# Patient Record
Sex: MALE | Race: WHITE
[De-identification: names, ages, dates, MRNs, and addresses within clinical notes are randomized per-mention and may not be internally consistent; named-entity substitution may affect disease eponyms.]

---

## 2018-08-21 ENCOUNTER — RECORD ABSTRACTING (OUTPATIENT)
Age: 73
End: 2018-08-21

## 2018-08-21 DIAGNOSIS — I25.10 ATHEROSCLEROTIC HEART DISEASE OF NATIVE CORONARY ARTERY W/OUT ANGINA PECTORIS: ICD-10-CM

## 2018-08-21 DIAGNOSIS — Z87.39 PERSONAL HISTORY OF OTHER DISEASES OF THE MUSCULOSKELETAL SYSTEM AND CONNECTIVE TISSUE: ICD-10-CM

## 2018-08-21 DIAGNOSIS — N28.1 CYST OF KIDNEY, ACQUIRED: ICD-10-CM

## 2018-08-21 DIAGNOSIS — Z86.59 PERSONAL HISTORY OF OTHER MENTAL AND BEHAVIORAL DISORDERS: ICD-10-CM

## 2018-08-21 DIAGNOSIS — Z86.79 PERSONAL HISTORY OF OTHER DISEASES OF THE CIRCULATORY SYSTEM: ICD-10-CM

## 2018-08-21 LAB
HBA1C MFR BLD: 8.3
PODIATRY EVAL: NORMAL

## 2018-08-21 RX ORDER — LANCETS 28 GAUGE
EACH MISCELLANEOUS
Refills: 0 | Status: ACTIVE | COMMUNITY

## 2018-08-21 RX ORDER — DULOXETINE HYDROCHLORIDE 30 MG/1
30 CAPSULE, DELAYED RELEASE PELLETS ORAL DAILY
Refills: 0 | Status: ACTIVE | COMMUNITY

## 2018-08-21 RX ORDER — ALLOPURINOL 100 MG/1
100 TABLET ORAL DAILY
Refills: 0 | Status: ACTIVE | COMMUNITY

## 2018-08-21 RX ORDER — BLOOD SUGAR DIAGNOSTIC
STRIP MISCELLANEOUS 4 TIMES DAILY
Refills: 0 | Status: ACTIVE | COMMUNITY

## 2018-08-21 RX ORDER — CARVEDILOL 6.25 MG/1
6.25 TABLET, FILM COATED ORAL TWICE DAILY
Refills: 0 | Status: ACTIVE | COMMUNITY

## 2018-08-21 RX ORDER — ATORVASTATIN CALCIUM 10 MG/1
10 TABLET, FILM COATED ORAL DAILY
Qty: 90 | Refills: 3 | Status: ACTIVE | COMMUNITY

## 2018-08-21 RX ORDER — CARBIDOPA AND LEVODOPA 25; 100 MG/1; MG/1
25-100 TABLET ORAL
Refills: 0 | Status: ACTIVE | COMMUNITY

## 2018-08-21 RX ORDER — LIRAGLUTIDE 6 MG/ML
18 INJECTION SUBCUTANEOUS DAILY
Refills: 0 | Status: ACTIVE | COMMUNITY

## 2018-08-29 ENCOUNTER — APPOINTMENT (OUTPATIENT)
Dept: ENDOCRINOLOGY | Facility: CLINIC | Age: 73
End: 2018-08-29
Payer: MEDICARE

## 2018-08-29 VITALS
SYSTOLIC BLOOD PRESSURE: 120 MMHG | WEIGHT: 312 LBS | BODY MASS INDEX: 43.68 KG/M2 | HEART RATE: 65 BPM | DIASTOLIC BLOOD PRESSURE: 64 MMHG | HEIGHT: 71 IN

## 2018-08-29 LAB — GLUCOSE BLDC GLUCOMTR-MCNC: 235

## 2018-08-29 PROCEDURE — 82962 GLUCOSE BLOOD TEST: CPT

## 2018-08-29 PROCEDURE — 99214 OFFICE O/P EST MOD 30 MIN: CPT | Mod: 25

## 2018-11-19 ENCOUNTER — RECORD ABSTRACTING (OUTPATIENT)
Age: 73
End: 2018-11-19

## 2018-11-20 ENCOUNTER — APPOINTMENT (OUTPATIENT)
Dept: ENDOCRINOLOGY | Facility: CLINIC | Age: 73
End: 2018-11-20
Payer: MEDICARE

## 2018-11-20 ENCOUNTER — RESULT CHARGE (OUTPATIENT)
Age: 73
End: 2018-11-20

## 2018-11-20 VITALS
HEART RATE: 76 BPM | WEIGHT: 315 LBS | SYSTOLIC BLOOD PRESSURE: 130 MMHG | BODY MASS INDEX: 44.1 KG/M2 | HEIGHT: 71 IN | DIASTOLIC BLOOD PRESSURE: 70 MMHG

## 2018-11-20 LAB — GLUCOSE BLDC GLUCOMTR-MCNC: 249

## 2018-11-20 PROCEDURE — 99214 OFFICE O/P EST MOD 30 MIN: CPT | Mod: 25

## 2018-11-20 PROCEDURE — 95250 CONT GLUC MNTR PHYS/QHP EQP: CPT

## 2018-11-20 RX ORDER — FLASH GLUCOSE SENSOR
KIT MISCELLANEOUS
Qty: 0 | Refills: 0 | Status: COMPLETED | OUTPATIENT
Start: 2018-11-20

## 2018-11-20 RX ADMIN — Medication 0: at 00:00

## 2018-11-29 ENCOUNTER — TRANSCRIPTION ENCOUNTER (OUTPATIENT)
Age: 73
End: 2018-11-29

## 2018-12-02 ENCOUNTER — RESULT CHARGE (OUTPATIENT)
Age: 73
End: 2018-12-02

## 2018-12-21 ENCOUNTER — CLINICAL ADVICE (OUTPATIENT)
Age: 73
End: 2018-12-21

## 2019-02-13 ENCOUNTER — OTHER (OUTPATIENT)
Age: 74
End: 2019-02-13

## 2019-02-20 ENCOUNTER — RECORD ABSTRACTING (OUTPATIENT)
Age: 74
End: 2019-02-20

## 2019-02-20 DIAGNOSIS — Z87.39 PERSONAL HISTORY OF OTHER DISEASES OF THE MUSCULOSKELETAL SYSTEM AND CONNECTIVE TISSUE: ICD-10-CM

## 2019-02-20 DIAGNOSIS — Z86.69 PERSONAL HISTORY OF OTHER DISEASES OF THE NERVOUS SYSTEM AND SENSE ORGANS: ICD-10-CM

## 2019-02-20 DIAGNOSIS — Z78.9 OTHER SPECIFIED HEALTH STATUS: ICD-10-CM

## 2019-03-04 LAB
HBA1C MFR BLD HPLC: 7.9
LDLC SERPL DIRECT ASSAY-MCNC: 45
MICROALBUMIN/CREAT 24H UR-RTO: 39.7

## 2019-03-05 ENCOUNTER — APPOINTMENT (OUTPATIENT)
Dept: ENDOCRINOLOGY | Facility: CLINIC | Age: 74
End: 2019-03-05
Payer: MEDICARE

## 2019-03-05 ENCOUNTER — RESULT CHARGE (OUTPATIENT)
Age: 74
End: 2019-03-05

## 2019-03-05 VITALS
WEIGHT: 285 LBS | DIASTOLIC BLOOD PRESSURE: 70 MMHG | HEIGHT: 71 IN | HEART RATE: 65 BPM | SYSTOLIC BLOOD PRESSURE: 136 MMHG | BODY MASS INDEX: 39.9 KG/M2

## 2019-03-05 LAB — GLUCOSE BLDC GLUCOMTR-MCNC: 193

## 2019-03-05 PROCEDURE — 99214 OFFICE O/P EST MOD 30 MIN: CPT | Mod: 25

## 2019-03-05 PROCEDURE — 82962 GLUCOSE BLOOD TEST: CPT

## 2019-03-05 RX ORDER — GABAPENTIN 300 MG/1
300 CAPSULE ORAL
Refills: 0 | Status: ACTIVE | COMMUNITY

## 2019-03-05 RX ORDER — TAMSULOSIN HYDROCHLORIDE 0.4 MG/1
0.4 CAPSULE ORAL
Refills: 0 | Status: ACTIVE | COMMUNITY

## 2019-03-05 RX ORDER — RIVAROXABAN 20 MG/1
20 TABLET, FILM COATED ORAL DAILY
Refills: 0 | Status: ACTIVE | COMMUNITY

## 2019-03-05 RX ORDER — INSULIN ASPART 100 [IU]/ML
(70-30) 100 INJECTION, SUSPENSION SUBCUTANEOUS AS DIRECTED
Refills: 0 | Status: DISCONTINUED | COMMUNITY
End: 2019-03-05

## 2019-03-05 NOTE — REVIEW OF SYSTEMS
[Chest Pain] : no chest pain [Shortness Of Breath] : no shortness of breath [de-identified] : irritated skin around waist

## 2019-03-05 NOTE — ASSESSMENT
[FreeTextEntry1] : 1. DM type 2, more stable control on basal insulin. Doing as well as can be expected.\par 2. Hyperlipidemia stable\par 3. Hypertension controlled

## 2019-03-05 NOTE — HISTORY OF PRESENT ILLNESS
[FreeTextEntry1] : Quality:  Type 2.   \par Severity:  Moderate\par Duration:  27\par Onset:  found DM on labs\par Associated Symptoms:  Nephropathy. Retinopathy. Neuropathy \par Modifying Factors:  Better with medication.   \par Notes:  Current regimen:\par 	basaglar 36\par 	actos 	15\par 	victoza	1.8\par metformin caused diarrhea\par byetta ineffective\par \par \par Blood Glucose Testing Information \par \par Patient is using the  meter and is testing 3 times per day.\par \par \par Past Medical History\par Notes:  ASHD, found on cath. No CHF or MI.\par ? silent CVA\par RBBB\par off simvastatin due to arthralgia; no improvement with discontinuation. Tolerating atorvastatin\par renal cysts\par sleep apnea, on CPAP\par started on amitriptyline\par NAFLD score >3\par Parkinson’s disease\par hyperkalemia, given kayexelate\par hypomagnesemia\par prostate CA-on hormone therapy. s/p RT and cyber-knife\par pacemaker. On xarelto\par

## 2019-04-19 ENCOUNTER — TRANSCRIPTION ENCOUNTER (OUTPATIENT)
Age: 74
End: 2019-04-19

## 2019-07-30 ENCOUNTER — APPOINTMENT (OUTPATIENT)
Dept: ENDOCRINOLOGY | Facility: CLINIC | Age: 74
End: 2019-07-30
Payer: MEDICARE

## 2019-07-30 VITALS
BODY MASS INDEX: 40.74 KG/M2 | SYSTOLIC BLOOD PRESSURE: 120 MMHG | HEIGHT: 71 IN | WEIGHT: 291 LBS | DIASTOLIC BLOOD PRESSURE: 70 MMHG | HEART RATE: 67 BPM

## 2019-07-30 PROCEDURE — 82962 GLUCOSE BLOOD TEST: CPT

## 2019-07-30 PROCEDURE — 99214 OFFICE O/P EST MOD 30 MIN: CPT | Mod: 25

## 2019-07-30 NOTE — PHYSICAL EXAM
[Thyroid Not Enlarged] : the thyroid was not enlarged [Clear to Auscultation] : lungs were clear to auscultation bilaterally [de-identified] : irregular rhythm [de-identified] : bilateral edema

## 2019-07-30 NOTE — REVIEW OF SYSTEMS
[Recent Weight Gain (___ Lbs)] : recent [unfilled] ~Ulb weight gain [Lower Ext Edema] : lower extremity edema

## 2019-07-30 NOTE — HISTORY OF PRESENT ILLNESS
[FreeTextEntry1] : Quality:  Type 2.   \par Severity:  Moderate\par Duration:  27\par Onset:  found DM on labs\par Associated Symptoms:  Nephropathy. Retinopathy. Neuropathy \par Modifying Factors:  Better with medication.   \par Notes:  Current regimen:\par 	basaglar 36\par 	actos 	15\par 	victoza	1.8\par metformin caused diarrhea\par byetta ineffective\par \par \par Blood Glucose Testing Information \par \par Patient is using the  meter and is testing 3 times per day.\par \par \par Past Medical History\par Notes:  ASHD, found on cath. No CHF or MI.\par ? silent CVA\par RBBB\par off simvastatin due to arthralgia; no improvement with discontinuation. Tolerating atorvastatin\par renal cysts\par sleep apnea, on CPAP\par started on amitriptyline\par NAFLD score >3\par Parkinson’s disease\par hyperkalemia, given kayexelate\par hypomagnesemia\par prostate CA-on hormone therapy. s/p RT and cyber-knife\par pacemaker. On xarelto. May need revision to defibrillator\par

## 2019-07-30 NOTE — ASSESSMENT
[FreeTextEntry1] : 1. DM type 2, more stable control on basal insulin. Doing as well as can be expected. Due to edema, and likely decreased LV function will stop actos. If control declines consider using a [id number]-2\par 2. Hyperlipidemia stable\par 3. Hypertension controlled

## 2019-09-16 ENCOUNTER — OTHER (OUTPATIENT)
Age: 74
End: 2019-09-16

## 2019-10-07 ENCOUNTER — MEDICATION RENEWAL (OUTPATIENT)
Age: 74
End: 2019-10-07

## 2019-10-07 RX ORDER — EMPAGLIFLOZIN 10 MG/1
10 TABLET, FILM COATED ORAL DAILY
Qty: 90 | Refills: 0 | Status: ACTIVE | COMMUNITY
Start: 2019-10-07 | End: 1900-01-01

## 2019-11-25 LAB — HBA1C MFR BLD HPLC: 7.5

## 2019-11-26 ENCOUNTER — APPOINTMENT (OUTPATIENT)
Dept: ENDOCRINOLOGY | Facility: CLINIC | Age: 74
End: 2019-11-26
Payer: MEDICARE

## 2019-11-26 ENCOUNTER — RESULT CHARGE (OUTPATIENT)
Age: 74
End: 2019-11-26

## 2019-11-26 VITALS
WEIGHT: 275 LBS | HEART RATE: 64 BPM | DIASTOLIC BLOOD PRESSURE: 70 MMHG | SYSTOLIC BLOOD PRESSURE: 120 MMHG | HEIGHT: 71 IN | BODY MASS INDEX: 38.5 KG/M2

## 2019-11-26 LAB
GLUCOSE BLDC GLUCOMTR-MCNC: 199
PODIATRY EVAL: NORMAL

## 2019-11-26 PROCEDURE — 99214 OFFICE O/P EST MOD 30 MIN: CPT | Mod: 25

## 2019-11-26 PROCEDURE — 82962 GLUCOSE BLOOD TEST: CPT

## 2019-11-26 RX ORDER — PIOGLITAZONE 15 MG/1
15 TABLET ORAL DAILY
Refills: 0 | Status: DISCONTINUED | COMMUNITY
End: 2019-11-26

## 2019-11-26 RX ORDER — INSULIN GLARGINE 100 [IU]/ML
100 INJECTION, SOLUTION SUBCUTANEOUS AS DIRECTED
Qty: 2 | Refills: 3 | Status: DISCONTINUED | OUTPATIENT
Start: 2018-11-20 | End: 2019-11-26

## 2019-11-26 RX ORDER — AMLODIPINE BESYLATE 2.5 MG/1
2.5 TABLET ORAL DAILY
Refills: 0 | Status: DISCONTINUED | COMMUNITY
End: 2019-11-26

## 2019-11-26 RX ORDER — SYRINGE AND NEEDLE,INSULIN,1ML 30 G X1/2"
30G X 1/2" SYRINGE, EMPTY DISPOSABLE MISCELLANEOUS
Refills: 0 | Status: DISCONTINUED | COMMUNITY
End: 2019-11-26

## 2019-11-26 NOTE — HISTORY OF PRESENT ILLNESS
[FreeTextEntry1] : Quality:  Type 2.   \par Severity:  Moderate\par Duration:  27\par Onset:  found DM on labs\par Associated Symptoms:  Nephropathy. Retinopathy. Neuropathy \par Modifying Factors:  Better with medication.   \par Notes:  Current regimen:\par 	lantus 42\par 	jardiance 10\par 	victoza	1.8\par metformin caused diarrhea\par byetta ineffective\par \par \par Blood Glucose Testing Information \par \par Patient is using the  meter and is testing 3 times per day.\par \par \par Past Medical History\par Notes:  ASHD, found on cath. No CHF or MI.\par ? silent CVA\par RBBB\par off simvastatin due to arthralgia; no improvement with discontinuation. Tolerating atorvastatin\par renal cysts\par sleep apnea, on CPAP\par started on amitriptyline\par NAFLD score >3\par Parkinson’s disease\par hyperkalemia, given kayexelate\par hypomagnesemia\par prostate CA-on hormone therapy. s/p RT and cyber-knife. Hospitalized for rectal bleeding felt to be due to radiation\par pacemaker. On xarelto. May need revision to defibrillator\par

## 2019-11-26 NOTE — ASSESSMENT
[FreeTextEntry1] : 1. DM type 2, some loss of control due to discontinuation of actos and replacement with jardiance. I am very reluctant to resume actos, due to likely decreased LV function (consideration of future defibrillator) as well as ? effects on retinopathy. Will titrate up lantus to 46 units\par 2. Hyperlipidemia on statin therapy\par 3. Hypertension controlled

## 2020-03-30 LAB
HBA1C MFR BLD HPLC: 10.3
LDLC SERPL DIRECT ASSAY-MCNC: 45

## 2020-03-31 ENCOUNTER — APPOINTMENT (OUTPATIENT)
Dept: ENDOCRINOLOGY | Facility: CLINIC | Age: 75
End: 2020-03-31
Payer: MEDICARE

## 2020-03-31 PROCEDURE — G2012 BRIEF CHECK IN BY MD/QHP: CPT

## 2020-05-19 ENCOUNTER — APPOINTMENT (OUTPATIENT)
Dept: ENDOCRINOLOGY | Facility: CLINIC | Age: 75
End: 2020-05-19

## 2020-07-21 ENCOUNTER — APPOINTMENT (OUTPATIENT)
Dept: ENDOCRINOLOGY | Facility: CLINIC | Age: 75
End: 2020-07-21

## 2020-07-23 ENCOUNTER — APPOINTMENT (OUTPATIENT)
Dept: ENDOCRINOLOGY | Facility: CLINIC | Age: 75
End: 2020-07-23
Payer: MEDICARE

## 2020-07-23 DIAGNOSIS — E11.42 TYPE 2 DIABETES MELLITUS WITH DIABETIC POLYNEUROPATHY: ICD-10-CM

## 2020-07-23 LAB
HBA1C MFR BLD HPLC: 7.7
LDLC SERPL DIRECT ASSAY-MCNC: 56
MICROALBUMIN/CREAT 24H UR-RTO: 7

## 2020-07-23 PROCEDURE — 99442: CPT | Mod: 95

## 2020-07-23 NOTE — ASSESSMENT
[FreeTextEntry1] : DM type 2, acceptable control given age and co-morbidities\par discussed lab data

## 2020-07-23 NOTE — HISTORY OF PRESENT ILLNESS
[Home] : at home, [unfilled] , at the time of the visit. [Medical Office: ([facility])___] : at the medical office located in  [FreeTextEntry1] : Quality:  Type 2.   \par Severity:  Moderate\par Duration:  28\par Onset:  found DM on labs\par Associated Symptoms:  Nephropathy. Retinopathy. Neuropathy \par Modifying Factors:  Better with medication.   \par Notes:  Current regimen:\par 	lantus 42\par 	jardiance 10\par 	victoza	1.8\par metformin caused diarrhea\par byetta ineffective\par \par \par Blood Glucose Testing Information \par \par Patient is using the  meter and is testing 3 times per day.\par \par \par Past Medical History\par Notes:  ASHD, found on cath. No CHF or MI.\par ? silent CVA\par RBBB\par off simvastatin due to arthralgia; no improvement with discontinuation. Tolerating atorvastatin\par renal cysts\par sleep apnea, on CPAP\par started on amitriptyline\par NAFLD score >3\par Parkinson’s disease\par hyperkalemia, given kayexelate\par hypomagnesemia\par prostate CA-on hormone therapy. s/p RT and cyber-knife. Hospitalized for rectal bleeding felt to be due to radiation\par pacemaker. On xarelto. May need revision to defibrillator\par  [Verbal consent obtained from patient] : the patient, [unfilled]

## 2020-09-11 ENCOUNTER — APPOINTMENT (OUTPATIENT)
Dept: DERMATOLOGY | Facility: CLINIC | Age: 75
End: 2020-09-11
Payer: MEDICARE

## 2020-09-11 PROCEDURE — 99203 OFFICE O/P NEW LOW 30 MIN: CPT | Mod: 25

## 2020-09-11 PROCEDURE — 17000 DESTRUCT PREMALG LESION: CPT

## 2020-10-27 ENCOUNTER — APPOINTMENT (OUTPATIENT)
Dept: ENDOCRINOLOGY | Facility: CLINIC | Age: 75
End: 2020-10-27
Payer: MEDICARE

## 2020-10-27 VITALS
HEIGHT: 71 IN | WEIGHT: 270 LBS | BODY MASS INDEX: 37.8 KG/M2 | SYSTOLIC BLOOD PRESSURE: 104 MMHG | TEMPERATURE: 97.4 F | DIASTOLIC BLOOD PRESSURE: 62 MMHG

## 2020-10-27 DIAGNOSIS — E78.00 PURE HYPERCHOLESTEROLEMIA, UNSPECIFIED: ICD-10-CM

## 2020-10-27 DIAGNOSIS — E11.49 TYPE 2 DIABETES MELLITUS WITH OTHER DIABETIC NEUROLOGICAL COMPLICATION: ICD-10-CM

## 2020-10-27 DIAGNOSIS — E11.65 TYPE 2 DIABETES MELLITUS WITH OTHER DIABETIC NEUROLOGICAL COMPLICATION: ICD-10-CM

## 2020-10-27 DIAGNOSIS — I10 ESSENTIAL (PRIMARY) HYPERTENSION: ICD-10-CM

## 2020-10-27 PROCEDURE — 99214 OFFICE O/P EST MOD 30 MIN: CPT

## 2020-10-27 NOTE — HISTORY OF PRESENT ILLNESS
[FreeTextEntry1] : Quality:  Type 2.   \par Severity:  Moderate\par Duration:  28\par Onset:  found DM on labs\par Associated Symptoms:  Nephropathy. Retinopathy. Neuropathy \par Modifying Factors:  Better with medication.   \par Notes:  Current regimen:\par 	lantus 42\par 	jardiance 10\par 	victoza	1.8\par metformin caused diarrhea\par byetta ineffective\par \par \par Blood Glucose Testing Information \par \par Patient is using the  meter and is testing 3 times per day.\par \par \par Past Medical History\par Notes:  ASHD, found on cath. No CHF or MI.\par ? silent CVA\par RBBB\par off simvastatin due to arthralgia; no improvement with discontinuation. Tolerating atorvastatin\par renal cysts\par sleep apnea, on CPAP\par started on amitriptyline\par NAFLD score >3\par Parkinson’s disease\par hyperkalemia, given kayexelate\par hypomagnesemia\par prostate CA-on hormone therapy. s/p RT and cyber-knife. Hospitalized for rectal bleeding felt to be due to radiation\par pacemaker. On xarelto. \par

## 2020-10-27 NOTE — PHYSICAL EXAM
[Thyroid Not Enlarged] : the thyroid was not enlarged [Clear to Auscultation] : lungs were clear to auscultation bilaterally [Normal Rate] : heart rate was normal [Regular Rhythm] : with a regular rhythm

## 2020-10-27 NOTE — ASSESSMENT
[FreeTextEntry1] : DM type 2, acceptable control given age and co-morbidities. Despite mild decline in renal function will maintain jardiance use, as LV function likely improved as a result\par Hyperlipidemia controlled

## 2020-11-16 ENCOUNTER — APPOINTMENT (OUTPATIENT)
Dept: DERMATOLOGY | Facility: CLINIC | Age: 75
End: 2020-11-16
Payer: MEDICARE

## 2020-11-16 PROCEDURE — 99212 OFFICE O/P EST SF 10 MIN: CPT

## 2021-02-16 ENCOUNTER — APPOINTMENT (OUTPATIENT)
Dept: ENDOCRINOLOGY | Facility: CLINIC | Age: 76
End: 2021-02-16
Payer: MEDICARE

## 2021-02-16 VITALS
WEIGHT: 280 LBS | SYSTOLIC BLOOD PRESSURE: 108 MMHG | DIASTOLIC BLOOD PRESSURE: 68 MMHG | HEIGHT: 71 IN | TEMPERATURE: 97 F | BODY MASS INDEX: 39.2 KG/M2

## 2021-02-16 DIAGNOSIS — E11.65 TYPE 2 DIABETES MELLITUS WITH HYPERGLYCEMIA: ICD-10-CM

## 2021-02-16 PROCEDURE — 95250 CONT GLUC MNTR PHYS/QHP EQP: CPT

## 2021-02-16 PROCEDURE — 99214 OFFICE O/P EST MOD 30 MIN: CPT

## 2021-02-16 NOTE — HISTORY OF PRESENT ILLNESS
[FreeTextEntry1] : Quality:  Type 2.   \par Severity:  Moderate\par Duration:  29\par Onset:  found DM on labs\par Associated Symptoms:  Nephropathy. Retinopathy. Neuropathy \par Modifying Factors:  Better with medication.   \par Notes:  Current regimen:\par 	lantus 42\par 	jardiance 10\par 	victoza	1.8\par                 recently started on glipizide by PCP\par metformin caused diarrhea\par byetta ineffective\par \par \par Blood Glucose Testing Information \par \par Patient is using the  meter and is testing 3 times per day.\par \par \par Past Medical History\par Notes:  ASHD, found on cath. No CHF or MI.\par ? silent CVA\par RBBB\par off simvastatin due to arthralgia; no improvement with discontinuation. Tolerating atorvastatin\par renal cysts\par sleep apnea, on CPAP\par started on amitriptyline\par NAFLD score >3\par Parkinson’s disease\par hyperkalemia, given kayexelate\par hypomagnesemia\par prostate CA-on hormone therapy. s/p RT and cyber-knife. Hospitalized for rectal bleeding felt to be due to radiation\par pacemaker. On xarelto. \par

## 2021-02-16 NOTE — ASSESSMENT
[FreeTextEntry1] : DM type 2, suboptimal control. Will place a priscilla tracker for pattern analysis\par Improvement possible through diet changes likely

## 2021-03-04 ENCOUNTER — NON-APPOINTMENT (OUTPATIENT)
Age: 76
End: 2021-03-04

## 2022-11-11 ENCOUNTER — OFFICE (OUTPATIENT)
Dept: URBAN - METROPOLITAN AREA CLINIC 104 | Facility: CLINIC | Age: 77
Setting detail: OPHTHALMOLOGY
End: 2022-11-11
Payer: MEDICARE

## 2022-11-11 DIAGNOSIS — E11.3311: ICD-10-CM

## 2022-11-11 PROCEDURE — 67210 TREATMENT OF RETINAL LESION: CPT | Performed by: SPECIALIST

## 2022-11-11 ASSESSMENT — TONOMETRY
OS_IOP_MMHG: 15
OD_IOP_MMHG: 12

## 2022-11-11 ASSESSMENT — VISUAL ACUITY
OD_BCVA: 20/40
OS_BCVA: 20/25

## 2022-11-11 ASSESSMENT — LID EXAM ASSESSMENTS
OD_BLEPHARITIS: RLL RUL 2+ 3+
OS_BLEPHARITIS: LLL LUL 2+ 3+

## 2022-11-11 ASSESSMENT — SUPERFICIAL PUNCTATE KERATITIS (SPK)
OS_SPK: 1+ 2+
OD_SPK: 1+ 2+

## 2022-11-25 ENCOUNTER — OFFICE (OUTPATIENT)
Dept: URBAN - METROPOLITAN AREA CLINIC 104 | Facility: CLINIC | Age: 77
Setting detail: OPHTHALMOLOGY
End: 2022-11-25
Payer: MEDICARE

## 2022-11-25 DIAGNOSIS — H35.3112: ICD-10-CM

## 2022-11-25 DIAGNOSIS — E11.3313: ICD-10-CM

## 2022-11-25 DIAGNOSIS — H43.813: ICD-10-CM

## 2022-11-25 DIAGNOSIS — E11.3312: ICD-10-CM

## 2022-11-25 DIAGNOSIS — Z79.4: ICD-10-CM

## 2022-11-25 DIAGNOSIS — H35.3121: ICD-10-CM

## 2022-11-25 PROCEDURE — 92250 FUNDUS PHOTOGRAPHY W/I&R: CPT | Performed by: SPECIALIST

## 2022-11-25 PROCEDURE — 67028 INJECTION EYE DRUG: CPT | Performed by: SPECIALIST

## 2022-11-25 ASSESSMENT — LID EXAM ASSESSMENTS
OS_BLEPHARITIS: LLL LUL 2+ 3+
OD_BLEPHARITIS: RLL RUL 2+ 3+

## 2022-11-25 ASSESSMENT — SUPERFICIAL PUNCTATE KERATITIS (SPK)
OD_SPK: 1+ 2+
OS_SPK: 1+ 2+

## 2022-11-25 ASSESSMENT — CONFRONTATIONAL VISUAL FIELD TEST (CVF)
OD_FINDINGS: FULL
OS_FINDINGS: FULL

## 2022-11-25 ASSESSMENT — VISUAL ACUITY
OS_BCVA: 20/25
OD_BCVA: 20/40

## 2022-12-09 ENCOUNTER — OFFICE (OUTPATIENT)
Dept: URBAN - METROPOLITAN AREA CLINIC 104 | Facility: CLINIC | Age: 77
Setting detail: OPHTHALMOLOGY
End: 2022-12-09
Payer: MEDICARE

## 2022-12-09 DIAGNOSIS — H35.3112: ICD-10-CM

## 2022-12-09 DIAGNOSIS — H43.813: ICD-10-CM

## 2022-12-09 DIAGNOSIS — E11.3313: ICD-10-CM

## 2022-12-09 DIAGNOSIS — H35.3121: ICD-10-CM

## 2022-12-09 PROCEDURE — 99024 POSTOP FOLLOW-UP VISIT: CPT | Performed by: SPECIALIST

## 2022-12-09 PROCEDURE — 92134 CPTRZ OPH DX IMG PST SGM RTA: CPT | Performed by: SPECIALIST

## 2022-12-09 ASSESSMENT — VISUAL ACUITY
OD_BCVA: 20/40
OS_BCVA: 20/25

## 2022-12-09 ASSESSMENT — LID EXAM ASSESSMENTS
OD_BLEPHARITIS: RLL RUL 2+ 3+
OS_BLEPHARITIS: LLL LUL 2+ 3+

## 2022-12-09 ASSESSMENT — CONFRONTATIONAL VISUAL FIELD TEST (CVF)
OS_FINDINGS: FULL
OD_FINDINGS: FULL

## 2022-12-09 ASSESSMENT — SUPERFICIAL PUNCTATE KERATITIS (SPK)
OS_SPK: 1+ 2+
OD_SPK: 1+ 2+

## 2023-01-20 ENCOUNTER — OFFICE (OUTPATIENT)
Dept: URBAN - METROPOLITAN AREA CLINIC 63 | Facility: CLINIC | Age: 78
Setting detail: OPHTHALMOLOGY
End: 2023-01-20
Payer: MEDICARE

## 2023-01-20 DIAGNOSIS — E11.3313: ICD-10-CM

## 2023-01-20 DIAGNOSIS — H35.3121: ICD-10-CM

## 2023-01-20 DIAGNOSIS — E11.3311: ICD-10-CM

## 2023-01-20 DIAGNOSIS — H35.3112: ICD-10-CM

## 2023-01-20 DIAGNOSIS — H43.813: ICD-10-CM

## 2023-01-20 PROCEDURE — 67028 INJECTION EYE DRUG: CPT | Performed by: SPECIALIST

## 2023-01-20 PROCEDURE — 92250 FUNDUS PHOTOGRAPHY W/I&R: CPT | Performed by: SPECIALIST

## 2023-01-20 ASSESSMENT — LID EXAM ASSESSMENTS
OS_BLEPHARITIS: LLL LUL 2+ 3+
OD_BLEPHARITIS: RLL RUL 2+ 3+

## 2023-01-20 ASSESSMENT — VISUAL ACUITY
OS_BCVA: 20/30
OD_BCVA: 20/30

## 2023-01-20 ASSESSMENT — TONOMETRY
OD_IOP_MMHG: 20
OS_IOP_MMHG: 21

## 2023-01-20 ASSESSMENT — SUPERFICIAL PUNCTATE KERATITIS (SPK)
OD_SPK: 1+ 2+
OS_SPK: 1+ 2+

## 2023-02-08 ENCOUNTER — APPOINTMENT (OUTPATIENT)
Dept: DERMATOLOGY | Facility: CLINIC | Age: 78
End: 2023-02-08
Payer: MEDICARE

## 2023-02-08 PROCEDURE — 99212 OFFICE O/P EST SF 10 MIN: CPT | Mod: 25

## 2023-02-08 PROCEDURE — 17000 DESTRUCT PREMALG LESION: CPT | Mod: 59

## 2023-02-08 PROCEDURE — 17272 DSTR MAL LES S/N/H/F/G 1.1-2: CPT

## 2023-02-17 ENCOUNTER — OFFICE (OUTPATIENT)
Dept: URBAN - METROPOLITAN AREA CLINIC 63 | Facility: CLINIC | Age: 78
Setting detail: OPHTHALMOLOGY
End: 2023-02-17
Payer: MEDICARE

## 2023-02-17 DIAGNOSIS — H35.3121: ICD-10-CM

## 2023-02-17 DIAGNOSIS — E11.3313: ICD-10-CM

## 2023-02-17 DIAGNOSIS — H35.3112: ICD-10-CM

## 2023-02-17 DIAGNOSIS — E11.3312: ICD-10-CM

## 2023-02-17 PROCEDURE — 67028 INJECTION EYE DRUG: CPT | Performed by: SPECIALIST

## 2023-02-17 PROCEDURE — 92134 CPTRZ OPH DX IMG PST SGM RTA: CPT | Performed by: SPECIALIST

## 2023-02-17 ASSESSMENT — SUPERFICIAL PUNCTATE KERATITIS (SPK)
OS_SPK: 1+ 2+
OD_SPK: 1+ 2+

## 2023-02-17 ASSESSMENT — LID EXAM ASSESSMENTS
OS_BLEPHARITIS: LLL LUL 2+ 3+
OD_BLEPHARITIS: RLL RUL 2+ 3+

## 2023-02-17 ASSESSMENT — VISUAL ACUITY
OD_BCVA: 20/25
OS_BCVA: 20/30

## 2023-02-17 ASSESSMENT — TONOMETRY
OD_IOP_MMHG: 17
OS_IOP_MMHG: 18

## 2023-03-03 ENCOUNTER — OFFICE (OUTPATIENT)
Dept: URBAN - METROPOLITAN AREA CLINIC 63 | Facility: CLINIC | Age: 78
Setting detail: OPHTHALMOLOGY
End: 2023-03-03
Payer: MEDICARE

## 2023-03-03 DIAGNOSIS — H35.3112: ICD-10-CM

## 2023-03-03 DIAGNOSIS — H43.813: ICD-10-CM

## 2023-03-03 DIAGNOSIS — E11.3313: ICD-10-CM

## 2023-03-03 DIAGNOSIS — H35.3121: ICD-10-CM

## 2023-03-03 DIAGNOSIS — E11.3312: ICD-10-CM

## 2023-03-03 PROCEDURE — 92250 FUNDUS PHOTOGRAPHY W/I&R: CPT | Performed by: SPECIALIST

## 2023-03-03 PROCEDURE — 67210 TREATMENT OF RETINAL LESION: CPT | Performed by: SPECIALIST

## 2023-03-03 ASSESSMENT — SUPERFICIAL PUNCTATE KERATITIS (SPK)
OS_SPK: 1+ 2+
OD_SPK: 1+ 2+

## 2023-03-03 ASSESSMENT — TONOMETRY
OD_IOP_MMHG: 16
OS_IOP_MMHG: 17

## 2023-03-03 ASSESSMENT — LID EXAM ASSESSMENTS
OS_BLEPHARITIS: LLL LUL 2+ 3+
OD_BLEPHARITIS: RLL RUL 2+ 3+

## 2023-03-03 ASSESSMENT — VISUAL ACUITY
OD_BCVA: 20/30
OS_BCVA: 20/20-1

## 2023-03-03 ASSESSMENT — CONFRONTATIONAL VISUAL FIELD TEST (CVF)
OS_FINDINGS: FULL
OD_FINDINGS: FULL

## 2023-03-31 ENCOUNTER — OFFICE (OUTPATIENT)
Dept: URBAN - METROPOLITAN AREA CLINIC 63 | Facility: CLINIC | Age: 78
Setting detail: OPHTHALMOLOGY
End: 2023-03-31
Payer: MEDICARE

## 2023-03-31 DIAGNOSIS — H43.813: ICD-10-CM

## 2023-03-31 DIAGNOSIS — E11.3313: ICD-10-CM

## 2023-03-31 DIAGNOSIS — H35.3121: ICD-10-CM

## 2023-03-31 DIAGNOSIS — E11.3311: ICD-10-CM

## 2023-03-31 DIAGNOSIS — Z79.4: ICD-10-CM

## 2023-03-31 DIAGNOSIS — H35.3112: ICD-10-CM

## 2023-03-31 PROCEDURE — 92134 CPTRZ OPH DX IMG PST SGM RTA: CPT | Performed by: SPECIALIST

## 2023-03-31 PROCEDURE — 67210 TREATMENT OF RETINAL LESION: CPT | Performed by: SPECIALIST

## 2023-03-31 ASSESSMENT — VISUAL ACUITY
OS_BCVA: 20/25
OD_BCVA: 20/20-1

## 2023-03-31 ASSESSMENT — CONFRONTATIONAL VISUAL FIELD TEST (CVF)
OD_FINDINGS: FULL
OS_FINDINGS: FULL

## 2023-03-31 ASSESSMENT — SUPERFICIAL PUNCTATE KERATITIS (SPK)
OS_SPK: 1+ 2+
OD_SPK: 1+ 2+

## 2023-03-31 ASSESSMENT — TONOMETRY
OD_IOP_MMHG: 20
OS_IOP_MMHG: 17

## 2023-03-31 ASSESSMENT — LID EXAM ASSESSMENTS
OD_BLEPHARITIS: RLL RUL 2+ 3+
OS_BLEPHARITIS: LLL LUL 2+ 3+

## 2023-06-14 ENCOUNTER — OFFICE (OUTPATIENT)
Dept: URBAN - METROPOLITAN AREA CLINIC 63 | Facility: CLINIC | Age: 78
Setting detail: OPHTHALMOLOGY
End: 2023-06-14
Payer: MEDICARE

## 2023-06-14 DIAGNOSIS — H35.3112: ICD-10-CM

## 2023-06-14 DIAGNOSIS — E11.3313: ICD-10-CM

## 2023-06-14 DIAGNOSIS — E11.3312: ICD-10-CM

## 2023-06-14 DIAGNOSIS — H35.3121: ICD-10-CM

## 2023-06-14 DIAGNOSIS — H43.813: ICD-10-CM

## 2023-06-14 PROCEDURE — 92134 CPTRZ OPH DX IMG PST SGM RTA: CPT | Performed by: SPECIALIST

## 2023-06-14 PROCEDURE — 67210 TREATMENT OF RETINAL LESION: CPT | Performed by: SPECIALIST

## 2023-06-14 ASSESSMENT — CONFRONTATIONAL VISUAL FIELD TEST (CVF)
OD_FINDINGS: FULL
OS_FINDINGS: FULL

## 2023-06-14 ASSESSMENT — VISUAL ACUITY
OD_BCVA: 20/25-1
OS_BCVA: 20/25

## 2023-06-14 ASSESSMENT — SUPERFICIAL PUNCTATE KERATITIS (SPK)
OS_SPK: 1+ 2+
OD_SPK: 1+ 2+

## 2023-06-14 ASSESSMENT — LID EXAM ASSESSMENTS
OS_BLEPHARITIS: LLL LUL 2+ 3+
OD_BLEPHARITIS: RLL RUL 2+ 3+

## 2023-06-14 ASSESSMENT — TONOMETRY
OS_IOP_MMHG: 19
OD_IOP_MMHG: 19

## 2023-06-15 ENCOUNTER — OFFICE (OUTPATIENT)
Dept: URBAN - METROPOLITAN AREA CLINIC 104 | Facility: CLINIC | Age: 78
Setting detail: OPHTHALMOLOGY
End: 2023-06-15
Payer: MEDICARE

## 2023-06-15 DIAGNOSIS — E11.3311: ICD-10-CM

## 2023-06-15 DIAGNOSIS — E11.3313: ICD-10-CM

## 2023-06-15 DIAGNOSIS — H01.002: ICD-10-CM

## 2023-06-15 DIAGNOSIS — H01.004: ICD-10-CM

## 2023-06-15 DIAGNOSIS — H01.001: ICD-10-CM

## 2023-06-15 DIAGNOSIS — H04.121: ICD-10-CM

## 2023-06-15 DIAGNOSIS — H35.3112: ICD-10-CM

## 2023-06-15 DIAGNOSIS — Z79.4: ICD-10-CM

## 2023-06-15 DIAGNOSIS — H35.3121: ICD-10-CM

## 2023-06-15 DIAGNOSIS — E11.3312: ICD-10-CM

## 2023-06-15 DIAGNOSIS — H01.005: ICD-10-CM

## 2023-06-15 DIAGNOSIS — H04.122: ICD-10-CM

## 2023-06-15 PROCEDURE — 92014 COMPRE OPH EXAM EST PT 1/>: CPT | Performed by: SPECIALIST

## 2023-06-15 ASSESSMENT — VISUAL ACUITY
OS_BCVA: 20/25
OD_BCVA: 20/25-1

## 2023-06-15 ASSESSMENT — LID EXAM ASSESSMENTS
OD_BLEPHARITIS: RLL RUL 1+ 2+
OS_BLEPHARITIS: LLL LUL 1+ 2+

## 2023-06-15 ASSESSMENT — SUPERFICIAL PUNCTATE KERATITIS (SPK)
OS_SPK: 1+
OD_SPK: 1+

## 2023-06-15 ASSESSMENT — CONFRONTATIONAL VISUAL FIELD TEST (CVF)
OD_FINDINGS: FULL
OS_FINDINGS: FULL

## 2023-06-15 ASSESSMENT — TONOMETRY
OD_IOP_MMHG: 16
OS_IOP_MMHG: 16

## 2023-07-19 ENCOUNTER — OFFICE (OUTPATIENT)
Dept: URBAN - METROPOLITAN AREA CLINIC 63 | Facility: CLINIC | Age: 78
Setting detail: OPHTHALMOLOGY
End: 2023-07-19
Payer: MEDICARE

## 2023-07-19 DIAGNOSIS — E11.3312: ICD-10-CM

## 2023-07-19 DIAGNOSIS — E11.3313: ICD-10-CM

## 2023-07-19 DIAGNOSIS — H43.813: ICD-10-CM

## 2023-07-19 DIAGNOSIS — H35.3121: ICD-10-CM

## 2023-07-19 DIAGNOSIS — H35.3112: ICD-10-CM

## 2023-07-19 PROCEDURE — 92134 CPTRZ OPH DX IMG PST SGM RTA: CPT | Performed by: SPECIALIST

## 2023-07-19 PROCEDURE — 67028 INJECTION EYE DRUG: CPT | Performed by: SPECIALIST

## 2023-07-19 ASSESSMENT — CONFRONTATIONAL VISUAL FIELD TEST (CVF)
OD_FINDINGS: FULL
OS_FINDINGS: FULL

## 2023-07-19 ASSESSMENT — SUPERFICIAL PUNCTATE KERATITIS (SPK)
OD_SPK: 1+
OS_SPK: 1+

## 2023-07-19 ASSESSMENT — TONOMETRY
OS_IOP_MMHG: 17
OD_IOP_MMHG: 16

## 2023-07-19 ASSESSMENT — VISUAL ACUITY
OS_BCVA: 20/25
OD_BCVA: 20/25-1

## 2023-07-19 ASSESSMENT — LID EXAM ASSESSMENTS
OD_BLEPHARITIS: RLL RUL 1+ 2+
OS_BLEPHARITIS: LLL LUL 1+ 2+

## 2023-08-04 ENCOUNTER — OFFICE (OUTPATIENT)
Dept: URBAN - METROPOLITAN AREA CLINIC 63 | Facility: CLINIC | Age: 78
Setting detail: OPHTHALMOLOGY
End: 2023-08-04
Payer: MEDICARE

## 2023-08-04 DIAGNOSIS — E11.3313: ICD-10-CM

## 2023-08-04 DIAGNOSIS — E11.3311: ICD-10-CM

## 2023-08-04 DIAGNOSIS — H35.3121: ICD-10-CM

## 2023-08-04 DIAGNOSIS — H43.813: ICD-10-CM

## 2023-08-04 DIAGNOSIS — H35.3112: ICD-10-CM

## 2023-08-04 PROCEDURE — 92134 CPTRZ OPH DX IMG PST SGM RTA: CPT | Performed by: SPECIALIST

## 2023-08-04 PROCEDURE — 67210 TREATMENT OF RETINAL LESION: CPT | Performed by: SPECIALIST

## 2023-08-04 ASSESSMENT — VISUAL ACUITY
OS_BCVA: 20/25
OD_BCVA: 20/25

## 2023-08-04 ASSESSMENT — CONFRONTATIONAL VISUAL FIELD TEST (CVF)
OD_FINDINGS: FULL
OS_FINDINGS: FULL

## 2023-08-04 ASSESSMENT — SUPERFICIAL PUNCTATE KERATITIS (SPK)
OS_SPK: 1+
OD_SPK: 1+

## 2023-08-04 ASSESSMENT — LID EXAM ASSESSMENTS
OS_BLEPHARITIS: LLL LUL 1+ 2+
OD_BLEPHARITIS: RLL RUL 1+ 2+

## 2023-08-04 ASSESSMENT — TONOMETRY
OS_IOP_MMHG: 16
OD_IOP_MMHG: 16

## 2023-09-29 ENCOUNTER — RX ONLY (RX ONLY)
Age: 78
End: 2023-09-29

## 2023-09-29 ENCOUNTER — OFFICE (OUTPATIENT)
Dept: URBAN - METROPOLITAN AREA CLINIC 63 | Facility: CLINIC | Age: 78
Setting detail: OPHTHALMOLOGY
End: 2023-09-29
Payer: MEDICARE

## 2023-09-29 DIAGNOSIS — E11.3313: ICD-10-CM

## 2023-09-29 DIAGNOSIS — E11.3312: ICD-10-CM

## 2023-09-29 PROCEDURE — 92134 CPTRZ OPH DX IMG PST SGM RTA: CPT | Performed by: SPECIALIST

## 2023-09-29 PROCEDURE — 67028 INJECTION EYE DRUG: CPT | Performed by: SPECIALIST

## 2023-09-29 ASSESSMENT — VISUAL ACUITY
OD_BCVA: 20/25-
OS_BCVA: 20/25

## 2023-09-29 ASSESSMENT — SUPERFICIAL PUNCTATE KERATITIS (SPK)
OD_SPK: 1+
OS_SPK: 1+

## 2023-09-29 ASSESSMENT — LID EXAM ASSESSMENTS
OD_BLEPHARITIS: RLL RUL 1+ 2+
OS_BLEPHARITIS: LLL LUL 1+ 2+

## 2023-10-20 ENCOUNTER — OFFICE (OUTPATIENT)
Dept: URBAN - METROPOLITAN AREA CLINIC 63 | Facility: CLINIC | Age: 78
Setting detail: OPHTHALMOLOGY
End: 2023-10-20
Payer: MEDICARE

## 2023-10-20 DIAGNOSIS — H35.3121: ICD-10-CM

## 2023-10-20 DIAGNOSIS — E11.3312: ICD-10-CM

## 2023-10-20 DIAGNOSIS — H43.813: ICD-10-CM

## 2023-10-20 DIAGNOSIS — H35.3112: ICD-10-CM

## 2023-10-20 DIAGNOSIS — E11.3313: ICD-10-CM

## 2023-10-20 DIAGNOSIS — Z79.4: ICD-10-CM

## 2023-10-20 PROCEDURE — 67210 TREATMENT OF RETINAL LESION: CPT | Mod: 79,LT | Performed by: SPECIALIST

## 2023-10-20 PROCEDURE — 92134 CPTRZ OPH DX IMG PST SGM RTA: CPT | Performed by: SPECIALIST

## 2023-10-20 PROCEDURE — 99024 POSTOP FOLLOW-UP VISIT: CPT | Performed by: SPECIALIST

## 2023-10-20 ASSESSMENT — LID EXAM ASSESSMENTS
OS_BLEPHARITIS: LLL LUL 1+ 2+
OD_BLEPHARITIS: RLL RUL 1+ 2+

## 2023-10-20 ASSESSMENT — CONFRONTATIONAL VISUAL FIELD TEST (CVF)
OS_FINDINGS: FULL
OD_FINDINGS: FULL

## 2023-10-20 ASSESSMENT — TONOMETRY
OS_IOP_MMHG: 18
OD_IOP_MMHG: 18

## 2023-10-20 ASSESSMENT — VISUAL ACUITY
OS_BCVA: 20/25
OD_BCVA: 20/25

## 2023-10-20 ASSESSMENT — SUPERFICIAL PUNCTATE KERATITIS (SPK)
OD_SPK: 1+
OS_SPK: 1+

## 2023-12-01 ENCOUNTER — OFFICE (OUTPATIENT)
Dept: URBAN - METROPOLITAN AREA CLINIC 63 | Facility: CLINIC | Age: 78
Setting detail: OPHTHALMOLOGY
End: 2023-12-01
Payer: MEDICARE

## 2023-12-01 DIAGNOSIS — E11.3312: ICD-10-CM

## 2023-12-01 DIAGNOSIS — E11.3313: ICD-10-CM

## 2023-12-01 PROCEDURE — 67028 INJECTION EYE DRUG: CPT | Mod: 58,LT | Performed by: SPECIALIST

## 2023-12-01 PROCEDURE — 92134 CPTRZ OPH DX IMG PST SGM RTA: CPT | Performed by: SPECIALIST

## 2023-12-01 ASSESSMENT — CONFRONTATIONAL VISUAL FIELD TEST (CVF)
OD_FINDINGS: FULL
OS_FINDINGS: FULL

## 2023-12-01 ASSESSMENT — LID EXAM ASSESSMENTS
OS_BLEPHARITIS: LLL LUL 1+ 2+
OD_BLEPHARITIS: RLL RUL 1+ 2+

## 2023-12-01 ASSESSMENT — SUPERFICIAL PUNCTATE KERATITIS (SPK)
OD_SPK: 1+
OS_SPK: 1+

## 2023-12-20 ENCOUNTER — OFFICE (OUTPATIENT)
Dept: URBAN - METROPOLITAN AREA CLINIC 63 | Facility: CLINIC | Age: 78
Setting detail: OPHTHALMOLOGY
End: 2023-12-20
Payer: MEDICARE

## 2023-12-20 DIAGNOSIS — E11.3313: ICD-10-CM

## 2023-12-20 DIAGNOSIS — H35.3112: ICD-10-CM

## 2023-12-20 DIAGNOSIS — H35.3121: ICD-10-CM

## 2023-12-20 DIAGNOSIS — H43.813: ICD-10-CM

## 2023-12-20 DIAGNOSIS — E11.3311: ICD-10-CM

## 2023-12-20 PROCEDURE — 92134 CPTRZ OPH DX IMG PST SGM RTA: CPT | Performed by: SPECIALIST

## 2023-12-20 PROCEDURE — 67210 TREATMENT OF RETINAL LESION: CPT | Mod: 79,RT | Performed by: SPECIALIST

## 2023-12-20 ASSESSMENT — LID EXAM ASSESSMENTS
OS_BLEPHARITIS: LLL LUL 1+ 2+
OD_BLEPHARITIS: RLL RUL 1+ 2+

## 2023-12-20 ASSESSMENT — CONFRONTATIONAL VISUAL FIELD TEST (CVF)
OD_FINDINGS: FULL
OS_FINDINGS: FULL

## 2023-12-20 ASSESSMENT — SUPERFICIAL PUNCTATE KERATITIS (SPK)
OD_SPK: 1+
OS_SPK: 1+

## 2024-06-20 ENCOUNTER — OFFICE (OUTPATIENT)
Dept: URBAN - METROPOLITAN AREA CLINIC 104 | Facility: CLINIC | Age: 79
Setting detail: OPHTHALMOLOGY
End: 2024-06-20
Payer: MEDICARE

## 2024-06-20 DIAGNOSIS — H35.3131: ICD-10-CM

## 2024-06-20 DIAGNOSIS — H04.121: ICD-10-CM

## 2024-06-20 DIAGNOSIS — E11.3293: ICD-10-CM

## 2024-06-20 DIAGNOSIS — H01.001: ICD-10-CM

## 2024-06-20 PROCEDURE — 92014 COMPRE OPH EXAM EST PT 1/>: CPT | Performed by: SPECIALIST

## 2024-06-20 ASSESSMENT — LID EXAM ASSESSMENTS
OS_BLEPHARITIS: LLL LUL 2+
OS_COMMENTS: PROBABLE DEMODEX UL COMMENTS
OD_COMMENTS: PROBABLE DEMODEX UL COMMENTS
OD_BLEPHARITIS: RLL RUL 2+

## 2024-06-20 ASSESSMENT — CONFRONTATIONAL VISUAL FIELD TEST (CVF)
OD_FINDINGS: FULL
OS_FINDINGS: FULL

## 2025-01-16 ENCOUNTER — OFFICE (OUTPATIENT)
Dept: URBAN - METROPOLITAN AREA CLINIC 104 | Facility: CLINIC | Age: 80
Setting detail: OPHTHALMOLOGY
End: 2025-01-16
Payer: MEDICARE

## 2025-01-16 DIAGNOSIS — H04.121: ICD-10-CM

## 2025-01-16 DIAGNOSIS — H01.001: ICD-10-CM

## 2025-01-16 DIAGNOSIS — E11.3293: ICD-10-CM

## 2025-01-16 DIAGNOSIS — H01.004: ICD-10-CM

## 2025-01-16 DIAGNOSIS — H43.813: ICD-10-CM

## 2025-01-16 DIAGNOSIS — H01.002: ICD-10-CM

## 2025-01-16 DIAGNOSIS — H35.3131: ICD-10-CM

## 2025-01-16 DIAGNOSIS — H26.492: ICD-10-CM

## 2025-01-16 DIAGNOSIS — H04.122: ICD-10-CM

## 2025-01-16 DIAGNOSIS — Z96.1: ICD-10-CM

## 2025-01-16 DIAGNOSIS — Z79.4: ICD-10-CM

## 2025-01-16 DIAGNOSIS — H01.005: ICD-10-CM

## 2025-01-16 PROCEDURE — 92014 COMPRE OPH EXAM EST PT 1/>: CPT | Performed by: SPECIALIST

## 2025-01-16 PROCEDURE — 92134 CPTRZ OPH DX IMG PST SGM RTA: CPT | Performed by: SPECIALIST

## 2025-01-16 ASSESSMENT — REFRACTION_MANIFEST
OS_ADD: +2.25
OS_SPHERE: -0.50
OS_AXIS: 165
OD_AXIS: 050
OS_CYLINDER: -0.50
OD_VA2: 20/20
OS_VA2: 20/20
OD_SPHERE: PLANO
OD_ADD: +2.25
OD_VA1: 20/20
OD_CYLINDER: -0.50
OS_VA1: 20/20

## 2025-01-16 ASSESSMENT — KERATOMETRY
OS_AXISANGLE_DEGREES: 40
OD_K2POWER_DIOPTERS: 41.21
OS_K1POWER_DIOPTERS: 40.66
OD_AXISANGLE_DEGREES: 147
OS_K2POWER_DIOPTERS: 41.36
OD_K1POWER_DIOPTERS: 40.52

## 2025-01-16 ASSESSMENT — REFRACTION_CURRENTRX
OD_CYLINDER: -0.50
OS_SPHERE: PLANO
OD_AXIS: 35
OS_OVR_VA: 20/
OD_OVR_VA: 20/
OS_AXIS: 108
OD_ADD: +2.50
OS_CYLINDER: -0.75
OD_SPHERE: -0.25
OS_ADD: +2.50

## 2025-01-16 ASSESSMENT — LID EXAM ASSESSMENTS
OS_BLEPHARITIS: LLL LUL 2+
OD_BLEPHARITIS: RLL RUL 2+
OS_COMMENTS: PROBABLE DEMODEX UL COMMENTS
OD_COMMENTS: PROBABLE DEMODEX UL COMMENTS

## 2025-01-16 ASSESSMENT — REFRACTION_AUTOREFRACTION
OS_CYLINDER: -0.50
OD_AXIS: 52
OD_CYLINDER: -0.50
OS_SPHERE: -0.75
OS_AXIS: 163
OD_SPHERE: -0.50

## 2025-01-16 ASSESSMENT — TONOMETRY
OD_IOP_MMHG: 14
OS_IOP_MMHG: 14

## 2025-01-16 ASSESSMENT — VISUAL ACUITY
OD_BCVA: 20/20
OS_BCVA: 20/20

## 2025-01-16 ASSESSMENT — SUPERFICIAL PUNCTATE KERATITIS (SPK)
OD_SPK: 1+
OS_SPK: 1+

## 2025-01-16 ASSESSMENT — CONFRONTATIONAL VISUAL FIELD TEST (CVF)
OD_FINDINGS: FULL
OS_FINDINGS: FULL

## 2025-02-07 ENCOUNTER — OFFICE (OUTPATIENT)
Dept: URBAN - METROPOLITAN AREA CLINIC 104 | Facility: CLINIC | Age: 80
Setting detail: OPHTHALMOLOGY
End: 2025-02-07
Payer: MEDICARE

## 2025-02-07 DIAGNOSIS — E11.3313: ICD-10-CM

## 2025-02-07 DIAGNOSIS — H35.363: ICD-10-CM

## 2025-02-07 PROCEDURE — 92134 CPTRZ OPH DX IMG PST SGM RTA: CPT | Performed by: OPHTHALMOLOGY

## 2025-02-07 PROCEDURE — 92235 FLUORESCEIN ANGRPH MLTIFRAME: CPT | Performed by: OPHTHALMOLOGY

## 2025-02-07 PROCEDURE — 92014 COMPRE OPH EXAM EST PT 1/>: CPT | Mod: 25 | Performed by: OPHTHALMOLOGY

## 2025-02-07 PROCEDURE — 67028 INJECTION EYE DRUG: CPT | Mod: LT | Performed by: OPHTHALMOLOGY

## 2025-02-07 ASSESSMENT — KERATOMETRY
OD_AXISANGLE_DEGREES: 147
OD_K2POWER_DIOPTERS: 41.21
OS_K2POWER_DIOPTERS: 41.36
OS_AXISANGLE_DEGREES: 40
OD_K1POWER_DIOPTERS: 40.52
OS_K1POWER_DIOPTERS: 40.66

## 2025-02-07 ASSESSMENT — REFRACTION_AUTOREFRACTION
OS_SPHERE: -0.75
OD_AXIS: 52
OD_CYLINDER: -0.50
OS_AXIS: 163
OS_CYLINDER: -0.50
OD_SPHERE: -0.50

## 2025-02-07 ASSESSMENT — LID EXAM ASSESSMENTS
OD_BLEPHARITIS: RLL RUL 2+
OS_BLEPHARITIS: LLL LUL 2+
OD_COMMENTS: PROBABLE DEMODEX UL COMMENTS
OS_COMMENTS: PROBABLE DEMODEX UL COMMENTS

## 2025-02-07 ASSESSMENT — CONFRONTATIONAL VISUAL FIELD TEST (CVF)
OS_FINDINGS: FULL
OD_FINDINGS: FULL

## 2025-02-07 ASSESSMENT — VISUAL ACUITY
OS_BCVA: 20/25
OD_BCVA: 20/25

## 2025-02-07 ASSESSMENT — SUPERFICIAL PUNCTATE KERATITIS (SPK)
OD_SPK: 1+
OS_SPK: 1+

## 2025-04-04 ENCOUNTER — OFFICE (OUTPATIENT)
Dept: URBAN - METROPOLITAN AREA CLINIC 104 | Facility: CLINIC | Age: 80
Setting detail: OPHTHALMOLOGY
End: 2025-04-04
Payer: MEDICARE

## 2025-04-04 DIAGNOSIS — H35.363: ICD-10-CM

## 2025-04-04 DIAGNOSIS — E11.3313: ICD-10-CM

## 2025-04-04 PROCEDURE — 92012 INTRM OPH EXAM EST PATIENT: CPT | Performed by: OPHTHALMOLOGY

## 2025-04-04 PROCEDURE — 92134 CPTRZ OPH DX IMG PST SGM RTA: CPT | Performed by: OPHTHALMOLOGY

## 2025-04-04 ASSESSMENT — CONFRONTATIONAL VISUAL FIELD TEST (CVF)
OD_FINDINGS: FULL
OS_FINDINGS: FULL

## 2025-04-04 ASSESSMENT — VISUAL ACUITY
OS_BCVA: 20/25
OD_BCVA: 20/30

## 2025-04-04 ASSESSMENT — LID EXAM ASSESSMENTS
OD_COMMENTS: PROBABLE DEMODEX UL COMMENTS
OS_BLEPHARITIS: LLL LUL 2+
OD_BLEPHARITIS: RLL RUL 2+
OS_COMMENTS: PROBABLE DEMODEX UL COMMENTS

## 2025-04-04 ASSESSMENT — SUPERFICIAL PUNCTATE KERATITIS (SPK)
OD_SPK: 1+
OS_SPK: 1+